# Patient Record
Sex: MALE
[De-identification: names, ages, dates, MRNs, and addresses within clinical notes are randomized per-mention and may not be internally consistent; named-entity substitution may affect disease eponyms.]

---

## 2018-02-15 ENCOUNTER — HOSPITAL ENCOUNTER (INPATIENT)
Dept: HOSPITAL 14 - H.ER | Age: 38
LOS: 3 days | Discharge: HOME | DRG: 901 | End: 2018-02-18
Attending: FAMILY MEDICINE | Admitting: FAMILY MEDICINE
Payer: COMMERCIAL

## 2018-02-15 DIAGNOSIS — A41.9: Primary | ICD-10-CM

## 2018-02-15 DIAGNOSIS — Z23: ICD-10-CM

## 2018-02-15 DIAGNOSIS — K59.00: ICD-10-CM

## 2018-02-15 DIAGNOSIS — K57.20: ICD-10-CM

## 2018-02-15 DIAGNOSIS — R65.20: ICD-10-CM

## 2018-02-15 DIAGNOSIS — Z87.891: ICD-10-CM

## 2018-02-15 DIAGNOSIS — K52.9: ICD-10-CM

## 2018-02-15 LAB
ALBUMIN SERPL-MCNC: 3.8 G/DL (ref 3.5–5)
ALBUMIN/GLOB SERPL: 1.3 {RATIO} (ref 1–2.1)
ALT SERPL-CCNC: 27 U/L (ref 21–72)
AST SERPL-CCNC: 16 U/L (ref 17–59)
BASE EXCESS BLDV CALC-SCNC: 1.7 MMOL/L (ref 0–2)
BASOPHILS # BLD AUTO: 0.1 K/UL (ref 0–0.2)
BASOPHILS NFR BLD: 0.3 % (ref 0–2)
BUN SERPL-MCNC: 18 MG/DL (ref 9–20)
CALCIUM SERPL-MCNC: 8.8 MG/DL (ref 8.4–10.2)
EOSINOPHIL # BLD AUTO: 0.1 K/UL (ref 0–0.7)
EOSINOPHIL NFR BLD: 0.3 % (ref 0–4)
ERYTHROCYTE [DISTWIDTH] IN BLOOD BY AUTOMATED COUNT: 13.3 % (ref 11.5–14.5)
GFR NON-AFRICAN AMERICAN: > 60
HGB BLD-MCNC: 13.4 G/DL (ref 12–18)
LIPASE SERPL-CCNC: 11 U/L (ref 23–300)
LYMPHOCYTE: 12 % (ref 20–50)
LYMPHOCYTES # BLD AUTO: 1.5 K/UL (ref 1–4.3)
LYMPHOCYTES NFR BLD AUTO: 8.6 % (ref 20–40)
MCH RBC QN AUTO: 27.7 PG (ref 27–31)
MCHC RBC AUTO-ENTMCNC: 32.1 G/DL (ref 33–37)
MCV RBC AUTO: 86.4 FL (ref 80–94)
MONOCYTE: 9 % (ref 0–10)
MONOCYTES # BLD: 1 K/UL (ref 0–0.8)
MONOCYTES NFR BLD: 6 % (ref 0–10)
NEUTROPHILS # BLD: 14.7 K/UL (ref 1.8–7)
NEUTROPHILS NFR BLD AUTO: 78 % (ref 42–75)
NEUTROPHILS NFR BLD AUTO: 84.8 % (ref 50–75)
NEUTS BAND NFR BLD: 1 % (ref 0–2)
NRBC BLD AUTO-RTO: 0 % (ref 0–0)
PCO2 BLDV: 45 MMHG (ref 40–60)
PH BLDV: 7.39 [PH] (ref 7.32–7.43)
PLATELET # BLD EST: NORMAL 10*3/UL
PLATELET # BLD: 174 K/UL (ref 130–400)
PMV BLD AUTO: 9.2 FL (ref 7.2–11.7)
RBC # BLD AUTO: 4.85 MIL/UL (ref 4.4–5.9)
RBC MORPH BLD: NORMAL
TOTAL CELLS COUNTED BLD: 100
VENOUS BLOOD FIO2: 21 %
VENOUS BLOOD GAS PO2: 35 MM/HG (ref 30–55)
WBC # BLD AUTO: 17.3 K/UL (ref 4.8–10.8)

## 2018-02-15 RX ADMIN — METRONIDAZOLE SCH MLS/HR: 500 INJECTION, SOLUTION INTRAVENOUS at 22:31

## 2018-02-15 RX ADMIN — WATER SCH: 1 INJECTION INTRAMUSCULAR; INTRAVENOUS; SUBCUTANEOUS at 22:00

## 2018-02-15 NOTE — CP.PCM.HP
History of Present Illness





- History of Present Illness


History of Present Illness: 





36 yo ,m, PMhx/o Colitis presents to ED c/o LLQ abdominal pain started 2 days 

ago, intermittent, 7/10 intensity, pinching sensation,not radiated,  aggravated 

by walking and moving, associated with constipation for the last 2 days. 

Patient reports he had 1 BM today semiliquid non-bloddy. He denies fever, n,v,

cough, chest pain, SOB, Hx/o constipation, odd food ingestion, dysuria. 





PMD: has not PMD, has not  medical insurance


PMHx: Colitis 


Allergies: NKDA


 Meds: none


PSHx: denies


SHx: 1-2 cigarettes per week; social EtOH; denies drug use


FHx: noncontributory





Ed Course


VS: Temp: 98, HR: 108 BP: 137/88


Labs: CBC 17.3>13.4<174  CMP normal.  VBG: normal.lactate 1.0


Imaging: CT abd: Acute diverticulitis affecting the descending colon with 

microperforation  


Meds:  vanco/zosyn. Iv fluids 





Present on Admission





- Present on Admission


Any Indicators Present on Admission: No


History of DVT/PE: No


History of Uncontrolled Diabetes: No


Urinary Catheter: No





Review of Systems





- Constitutional


Constitutional: As Per HPI





- Cardiovascular


Cardiovascular: As Per HPI.  absent: Chest Pain





- Respiratory


Respiratory: absent: Cough, Dyspnea





- Gastrointestinal


Gastrointestinal: Abdominal Pain





- Musculoskeletal


Musculoskeletal: As Per HPI





Past Patient History





- Infectious Disease


Hx of Infectious Diseases: None





- Past Social History


Smoking Status: Unknown If Ever Smoked





- PSYCHIATRIC


Hx Substance Use: No





Meds


Allergies/Adverse Reactions: 


 Allergies











Allergy/AdvReac Type Severity Reaction Status Date / Time


 


No Known Allergies Allergy   Verified 02/15/18 14:16














Physical Exam





- Constitutional


Appears: No Acute Distress





- Head Exam


Head Exam: ATRAUMATIC, NORMOCEPHALIC





- Eye Exam


Eye Exam: Normal appearance





- ENT Exam


ENT Exam: Mucous Membranes Moist





- Neck Exam


Neck exam: Positive for: Normal Inspection





- Respiratory Exam


Respiratory Exam: Clear to Auscultation Bilateral.  absent: Rales, Rhonchi, 

Wheezes, Stridor





- Cardiovascular Exam


Cardiovascular Exam: REGULAR RHYTHM, +S1, +S2





- GI/Abdominal Exam


GI & Abdominal Exam: Normal Bowel Sounds, Soft, Tenderness.  absent: Guarding, 

Rebound


Additional comments: 





LLQ and left flank TD, no rebound Td





- Extremities Exam


Extremities exam: Positive for: normal inspection.  Negative for: pedal edema





- Back Exam


Back exam: NORMAL INSPECTION





- Neurological Exam


Neurological exam: Alert, Oriented x3





- Psychiatric Exam


Psychiatric exam: Normal Affect, Normal Mood





- Skin


Skin Exam: Intact





Results





- Vital Signs


Recent Vital Signs: 





 Last Vital Signs











Temp  98.0 F   02/15/18 14:13


 


Pulse  108 H  02/15/18 14:13


 


Resp  16   02/15/18 14:13


 


BP  137/88   02/15/18 14:13


 


Pulse Ox  100   02/15/18 18:57














- Labs


Result Diagrams: 


 02/15/18 15:05





 02/15/18 15:05


Labs: 





 Laboratory Results - last 24 hr











  02/15/18 02/15/18 02/15/18





  15:05 15:05 19:40


 


WBC   17.3 H 


 


RBC   4.85 


 


Hgb   13.4 


 


Hct   41.9 


 


MCV   86.4 


 


MCH   27.7 


 


MCHC   32.1 L 


 


RDW   13.3 


 


Plt Count   174 


 


MPV   9.2 


 


Neut % (Auto)   84.8 H 


 


Lymph % (Auto)   8.6 L 


 


Mono % (Auto)   6.0 


 


Eos % (Auto)   0.3 


 


Baso % (Auto)   0.3 


 


Neut # (Auto)   14.7 H 


 


Lymph # (Auto)   1.5 


 


Mono # (Auto)   1.0 H 


 


Eos # (Auto)   0.1 


 


Baso # (Auto)   0.1 


 


Neutrophils % (Manual)   78 H 


 


Band Neutrophils %   1 


 


Lymphocytes % (Manual)   12 L 


 


Monocytes % (Manual)   9 


 


Platelet Estimate   Normal 


 


RBC Morphology   Normal 


 


pO2    35


 


VBG pH    7.39


 


VBG pCO2    45


 


VBG HCO3    25.4


 


VBG Total CO2    28.6 H


 


VBG O2 Sat (Calc)    74.7 H


 


VBG Base Excess    1.7


 


VBG Potassium    3.5 L


 


Glucose    101


 


Lactate    1.0


 


FiO2    21.0


 


Sodium  143   138.0


 


Potassium  3.6  


 


Chloride  104   104.0


 


Carbon Dioxide  26  


 


Anion Gap  17  


 


BUN  18  


 


Creatinine  1.1  


 


Est GFR ( Amer)  > 60  


 


Est GFR (Non-Af Amer)  > 60  


 


Random Glucose  127 H  


 


Calcium  8.8  


 


Total Bilirubin  0.9  


 


AST  16 L  


 


ALT  27  


 


Alkaline Phosphatase  63  


 


Total Protein  6.9  


 


Albumin  3.8  


 


Globulin  3.0  


 


Albumin/Globulin Ratio  1.3  


 


Lipase  11 L  


 


Venous Blood Potassium    3.5 L














Assessment & Plan





- Assessment and Plan (Free Text)


Plan: 





36 yo ,m, PMhx/o Colitis admitted for Diverticulitis with perforation and sepsis





1) Diverticulitis complicated with perforation


CTAbd: Acute diverticulitis in descended colon with microperforation


-NPO, IV fluids


-c/w Vanco/Zosyn/Flagyl 


-General surgery consult appreciated: Urgent surgery If patient decompensates 


-f/u CBC, CMP 





2) Sepsis


SIRS+Diverticulitis with perforation


 -c/w  -c/w Vanco/Zosyn/Flagyl 


-General Surgery consult appreciated: Emergent surgery if patient 

decompensates. 








3) DVT Prophylaxis 


-SCD.

## 2018-02-15 NOTE — CP.PCM.CON
History of Present Illness





- History of Present Illness


History of Present Illness: 





General Surgery


Dr. Robles





36 y/o M w/ PMHx of colitis presents to the ED c/o LLQ abd pain. Pt states pain 

began 2 days ago. Pain is non-radiating and constant. Pt denies having similar 

pain in the past. Over the last 2 days, the pain has no changed in intensity. 

Nothing seems to make the pain better or worse. Pt admits to subjective F/C as 

well as some diarrhea. Pt denies N/V, constipation, dysuria, CP. 





PMHx: see above


Meds: reviewed in chart


NKDA


PSHx: denies


SHx: 1-2 cigarettes per week; social EtOH; denies drug use


FHx: noncontributory





Review of Systems





- Review of Systems


All systems: reviewed and no additional remarkable complaints except (see HPI)





Past Patient History





- Infectious Disease


Hx of Infectious Diseases: None





- Past Social History


Smoking Status: Unknown If Ever Smoked





- PSYCHIATRIC


Hx Substance Use: No





Meds


Allergies/Adverse Reactions: 


 Allergies











Allergy/AdvReac Type Severity Reaction Status Date / Time


 


No Known Allergies Allergy   Verified 02/15/18 14:16














- Medications


Medications: 


 Current Medications





Lactated Ringer's (Lactated Ringer's 500ml)  500 mls @ 250 mls/hr IV .Q2H [facility]


   Last Admin: 02/15/18 15:18 Dose:  250 mls/hr


Vancomycin HCl 1 gm/ Sodium (Chloride)  250 mls @ 250 mls/hr IVPB ONCE [facility]


   PRN Reason: Protocol











Physical Exam





- Constitutional


Appears: Non-toxic, No Acute Distress





- Head Exam


Head Exam: NORMAL INSPECTION





- Eye Exam


Eye Exam: Normal appearance





- ENT Exam


ENT Exam: Mucous Membranes Moist





- Respiratory Exam


Respiratory Exam: NORMAL BREATHING PATTERN.  absent: Accessory Muscle Use, 

Respiratory Distress





- Cardiovascular Exam


Cardiovascular Exam: Tachycardia, REGULAR RHYTHM





- GI/Abdominal Exam


GI & Abdominal Exam: Soft, Tenderness (RLQ).  absent: Distended, Guarding, 

Rebound, Rigid





- Extremities Exam


Extremities exam: Positive for: normal inspection





- Neurological Exam


Neurological exam: Alert, Oriented x3





- Psychiatric Exam


Psychiatric exam: Normal Affect, Normal Mood





- Skin


Skin Exam: Dry, Intact, Normal Color, Warm





Results





- Vital Signs


Recent Vital Signs: 


 Last Vital Signs











Temp  98.0 F   02/15/18 14:13


 


Pulse  108 H  02/15/18 14:13


 


Resp  16   02/15/18 14:13


 


BP  137/88   02/15/18 14:13


 


Pulse Ox  100   02/15/18 18:57














- Labs


Result Diagrams: 


 02/15/18 15:05





 02/15/18 15:05


Labs: 


 Laboratory Results - last 24 hr











  02/15/18 02/15/18 02/15/18





  15:05 15:05 19:40


 


WBC   17.3 H 


 


RBC   4.85 


 


Hgb   13.4 


 


Hct   41.9 


 


MCV   86.4 


 


MCH   27.7 


 


MCHC   32.1 L 


 


RDW   13.3 


 


Plt Count   174 


 


MPV   9.2 


 


Neut % (Auto)   84.8 H 


 


Lymph % (Auto)   8.6 L 


 


Mono % (Auto)   6.0 


 


Eos % (Auto)   0.3 


 


Baso % (Auto)   0.3 


 


Neut # (Auto)   14.7 H 


 


Lymph # (Auto)   1.5 


 


Mono # (Auto)   1.0 H 


 


Eos # (Auto)   0.1 


 


Baso # (Auto)   0.1 


 


Neutrophils % (Manual)   78 H 


 


Band Neutrophils %   1 


 


Lymphocytes % (Manual)   12 L 


 


Monocytes % (Manual)   9 


 


Platelet Estimate   Normal 


 


RBC Morphology   Normal 


 


pO2    35


 


VBG pH    7.39


 


VBG pCO2    45


 


VBG HCO3    25.4


 


VBG Total CO2    28.6 H


 


VBG O2 Sat (Calc)    74.7 H


 


VBG Base Excess    1.7


 


VBG Potassium    3.5 L


 


Glucose    101


 


Lactate    1.0


 


FiO2    21.0


 


Sodium  143   138.0


 


Potassium  3.6  


 


Chloride  104   104.0


 


Carbon Dioxide  26  


 


Anion Gap  17  


 


BUN  18  


 


Creatinine  1.1  


 


Est GFR ( Amer)  > 60  


 


Est GFR (Non-Af Amer)  > 60  


 


Random Glucose  127 H  


 


Calcium  8.8  


 


Total Bilirubin  0.9  


 


AST  16 L  


 


ALT  27  


 


Alkaline Phosphatase  63  


 


Total Protein  6.9  


 


Albumin  3.8  


 


Globulin  3.0  


 


Albumin/Globulin Ratio  1.3  


 


Lipase  11 L  


 


Venous Blood Potassium    3.5 L














Assessment & Plan





- Assessment and Plan (Free Text)


Assessment: 





36 y/o M w/ abd pain 2/2 diverticulitis w/ contained microperforation





- NPO, IVF


- IV Abx


- pain management


- serial abd exams


- monitor vitals


- repeat labs in AM


- urgent surgery if pt decompensates





Pt discussed w/ Dr. Travis Oranets DO PGY2








- Date & Time


Date: 02/16/18


Time: 19:45

## 2018-02-15 NOTE — ED PDOC
HPI: Abdomen


Time Seen by Provider: 02/15/18 14:29


Chief Complaint (Nursing): Abdominal Pain


Chief Complaint (Provider): Abdominal pain


History Per: Patient


History/Exam Limitations: no limitations


Onset/Duration Of Symptoms: Days (x2)


Current Symptoms Are (Timing): Still Present


Location Of Pain/Discomfort: LUQ, LLQ


Quality Of Discomfort: "Pain"


Associated Symptoms: Fever, Chills, Constipation.  denies: Nausea, Vomiting, 

Diarrhea, Back Pain, Urinary Symptoms


Exacerbating Factors: Deep Breaths


Additional Complaint(s): 





Jose Pace is a 37 year old male, with a past medical history of colitis, 

who presents to the emergency department complaining of left sided abdominal 

pain associated with fever, chills and constipation onset for x2 days. Patient 

states pain is exacerbated with deep breaths. He denies any nausea, vomit, 

diarrhea, cough, congestion, runny nose, back pain or dysuria. No further 

medical complaints.


 


PMD: None provided. 





Past Medical History


Reviewed: Historical Data, Nursing Documentation, Vital Signs


Vital Signs: 


 Last Vital Signs











Temp  98.0 F   02/15/18 14:13


 


Pulse  108 H  02/15/18 14:13


 


Resp  16   02/15/18 14:13


 


BP  137/88   02/15/18 14:13


 


Pulse Ox  100   02/15/18 15:11














- Medical History


Other PMH: Colitis





- Surgical History


Surgical History: No Surg Hx





- Family History


Family History: States: Unknown Family Hx





- Allergies


Allergies/Adverse Reactions: 


 Allergies











Allergy/AdvReac Type Severity Reaction Status Date / Time


 


No Known Allergies Allergy   Verified 02/15/18 14:16














Review of Systems


ROS Statement: Except As Marked, All Systems Reviewed And Found Negative


Constitutional: Positive for: Fever, Chills


ENT: Negative for: Nose Congestion


Respiratory: Negative for: Cough


Gastrointestinal: Positive for: Abdominal Pain (left sided), Constipation.  

Negative for: Nausea, Vomiting, Diarrhea


Genitourinary Male: Negative for: Dysuria


Musculoskeletal: Negative for: Neck Pain, Back Pain





Physical Exam





- Reviewed


Nursing Documentation Reviewed: Yes


Vital Signs Reviewed: Yes





- Physical Exam


Appears: Positive for: Uncomfortable


Head Exam: Positive for: ATRAUMATIC, NORMAL INSPECTION, NORMOCEPHALIC


Skin: Positive for: Normal Color, Warm, Dry


Eye Exam: Positive for: Normal appearance, EOMI, PERRL


Neck: Positive for: Painless ROM, Supple


Cardiovascular/Chest: Positive for: Regular Rate, Rhythm.  Negative for: Murmur


Respiratory: Positive for: Normal Breath Sounds.  Negative for: Respiratory 

Distress


Gastrointestinal/Abdominal: Positive for: Soft, Tenderness (to upper and lower 

left abdomen. No epigastric or right sided tenderness).  Negative for: Guarding

, Rebound


Back: Positive for: Normal Inspection.  Negative for: L CVA Tenderness, R CVA 

Tenderness, Vertebral Tenderness


Extremity: Positive for: Normal ROM.  Negative for: Tenderness, Deformity, 

Swelling


Neurologic/Psych: Positive for: Alert, Oriented





- Laboratory Results


Result Diagrams: 


 02/15/18 15:05





 02/15/18 15:05


Interpretation Of Abn Labs: 17.3 wbc





- ECG


O2 Sat by Pulse Oximetry: 100 (RA)


Pulse Ox Interpretation: Normal





- Progress


ED Course And Treament: 





1844:  Stable.  Will need admit.  Spoke with surgery resident.  Will consult.  

Spoke with [facility] resident who will admit.





- Critical Care


Total Time (In Min): 30


Documented Critical Care: Time excludes all time spent performint seperately 

billable procedures





Medical Decision Making


Medical Decision Making: 





Initial Impression: Colitis





Initial Plan:





--Abd Pelvis PO & IV contrast [CT]


--CMP


--Lipase


--Urine dip


--CBC w/ differential


--Omnipaque 240 50 ml PO


--Toradol 15 mg IVP


--Lactated Ringers 500 ml  mls/hr


--reevaluation








--------------------------------------------------------------------------------

-----------------   


Scribe Attestation:


Documented by Dylan Ceh, acting as a scribe for Kenton Aguirre MD





Provider Scribe Attestation:


All medical record entries made by the Scribe were at my direction and 

personally dictated by me. I have reviewed the chart and agree that the record 

accurately reflects my personal performance of the history, physical exam, 

medical decision making, and the department course for this patient. I have 

also personally directed, reviewed, and agree with the discharge instructions 

and disposition.





Disposition





- Clinical Impression


Clinical Impression: 


 Severe sepsis, Diverticulitis of colon with perforation








- Patient ED Disposition


Is Patient to be Admitted: Yes


Counseled Patient/Family Regarding: Studies Performed, Diagnosis





- Disposition


Disposition Time: 18:57


Condition: FAIR





- Pt Status Changed To:


Hospital Disposition Of: Inpatient





- Admit Certification


Admit to Inpatient:: After my assessment, the patient will require 

hospitalization for at least two midnights.  This is because of the severity of 

symptoms shown, intensity of services needed, and/or the medical risk in this 

patient being treated as an outpatient.





- POA


Present On Arrival: None

## 2018-02-15 NOTE — CT
PROCEDURE:  CT Abdomen and Pelvis with contrast



HISTORY:

Abdominal pain. 



COMPARISON:

None.



TECHNIQUE:

Contrast dose: 90 cc Omnipaque 300



Radiation dose:



Total exam DLP = 1176.24 mGy-cm.



This CT exam was performed using one or more of the following dose 

reduction techniques: Automated exposure control, adjustment of the 

mA and/or kV according to patient size, and/or use of iterative 

reconstruction technique.



FINDINGS:



LOWER THORAX:

Unremarkable. 



LIVER:

Unremarkable. No gross lesion or ductal dilatation. 



GALLBLADDER AND BILE DUCTS:

Unremarkable. 



PANCREAS:

Unremarkable. No gross lesion or ductal dilatation.



SPLEEN:

Unremarkable. 



ADRENALS:

Unremarkable. No mass. 



KIDNEYS AND URETERS:

Unremarkable. No hydronephrosis. No solid mass. 



VASCULATURE:

Unremarkable. No aortic aneurysm. 



BOWEL:

Acute diverticulitis primarily affecting the descending colon with 

perforations along the mesenteric border with multiple micro 

perforations and the largest perforation measuring approximately 2.4 

x 3 cm. No drainable collection. The acute inflammatory process 

extends over a distance of approximately 12.5 cm. No free air. 



APPENDIX:

Normal appendix. 



PERITONEUM:

Unremarkable. No free fluid. No free air. 



LYMPH NODES:

Unremarkable. No enlarged lymph nodes. 



BLADDER:

Unremarkable. 



REPRODUCTIVE:

Unremarkable. 



BONES:

No acute fracture. 



OTHER FINDINGS:

None.



IMPRESSION:

Acute diverticulitis affecting the descending colon with micro 

perforations and a solitary walled-off larger collection of air 

without drainable collection/abscess.



Communication of results: I discussed the findings with the attending 

in the emergency department Dr. Aguirre at 18:27

## 2018-02-16 LAB
ALBUMIN SERPL-MCNC: 3.3 G/DL (ref 3.5–5)
ALBUMIN/GLOB SERPL: 1.1 {RATIO} (ref 1–2.1)
ALT SERPL-CCNC: 28 U/L (ref 21–72)
AST SERPL-CCNC: 15 U/L (ref 17–59)
BASOPHILS # BLD AUTO: 0 K/UL (ref 0–0.2)
BASOPHILS NFR BLD: 0.3 % (ref 0–2)
BUN SERPL-MCNC: 11 MG/DL (ref 9–20)
CALCIUM SERPL-MCNC: 8.5 MG/DL (ref 8.4–10.2)
EOSINOPHIL # BLD AUTO: 0.1 K/UL (ref 0–0.7)
EOSINOPHIL NFR BLD: 0.9 % (ref 0–4)
ERYTHROCYTE [DISTWIDTH] IN BLOOD BY AUTOMATED COUNT: 13.1 % (ref 11.5–14.5)
GFR NON-AFRICAN AMERICAN: > 60
HGB BLD-MCNC: 12.6 G/DL (ref 12–18)
LYMPHOCYTES # BLD AUTO: 1.3 K/UL (ref 1–4.3)
LYMPHOCYTES NFR BLD AUTO: 9.1 % (ref 20–40)
MCH RBC QN AUTO: 27.7 PG (ref 27–31)
MCHC RBC AUTO-ENTMCNC: 32 G/DL (ref 33–37)
MCV RBC AUTO: 86.6 FL (ref 80–94)
MONOCYTES # BLD: 1.1 K/UL (ref 0–0.8)
MONOCYTES NFR BLD: 7.4 % (ref 0–10)
NEUTROPHILS # BLD: 12.1 K/UL (ref 1.8–7)
NEUTROPHILS NFR BLD AUTO: 82.3 % (ref 50–75)
NRBC BLD AUTO-RTO: 0 % (ref 0–0)
PLATELET # BLD: 173 K/UL (ref 130–400)
PMV BLD AUTO: 10 FL (ref 7.2–11.7)
RBC # BLD AUTO: 4.54 MIL/UL (ref 4.4–5.9)
WBC # BLD AUTO: 14.8 K/UL (ref 4.8–10.8)

## 2018-02-16 RX ADMIN — ENOXAPARIN SODIUM SCH MG: 40 INJECTION SUBCUTANEOUS at 13:08

## 2018-02-16 RX ADMIN — METRONIDAZOLE SCH MLS/HR: 500 INJECTION, SOLUTION INTRAVENOUS at 09:40

## 2018-02-16 RX ADMIN — WATER SCH MLS/HR: 1 INJECTION INTRAMUSCULAR; INTRAVENOUS; SUBCUTANEOUS at 21:31

## 2018-02-16 RX ADMIN — WATER SCH MLS/HR: 1 INJECTION INTRAMUSCULAR; INTRAVENOUS; SUBCUTANEOUS at 09:40

## 2018-02-16 RX ADMIN — WATER SCH MLS/HR: 1 INJECTION INTRAMUSCULAR; INTRAVENOUS; SUBCUTANEOUS at 17:11

## 2018-02-16 RX ADMIN — METRONIDAZOLE SCH MLS/HR: 500 INJECTION, SOLUTION INTRAVENOUS at 17:12

## 2018-02-16 RX ADMIN — METRONIDAZOLE SCH: 500 INJECTION, SOLUTION INTRAVENOUS at 01:00

## 2018-02-16 RX ADMIN — WATER SCH MLS/HR: 1 INJECTION INTRAMUSCULAR; INTRAVENOUS; SUBCUTANEOUS at 03:38

## 2018-02-16 NOTE — CP.PCM.PN
Subjective





- Date & Time of Evaluation


Date of Evaluation: 02/16/18


Time of Evaluation: 10:04





- Subjective


Subjective: 





Patient seen and examined this morning, NAD, NPO, IV fluids. As per patient, 

his pain is improved, still 5/10 nonradiating LLQ pain. Denies any nausea, 

vomiting, diarrhea, dizziness, skin color changes or urinary symptoms. Patient 

admits feeling hungry.  





Objective





- Vital Signs/Intake and Output


Vital Signs (last 24 hours): 


 











Temp Pulse Resp BP Pulse Ox


 


 98.7 F   93 H  18   101/65   99 


 


 02/16/18 08:00  02/16/18 08:00  02/16/18 08:00  02/16/18 08:00  02/16/18 08:00








Intake and Output: 


 











 02/16/18 02/16/18





 06:59 18:59


 


Intake Total 1400 


 


Output Total 250 


 


Balance 1150 














- Medications


Medications: 


 Current Medications





Acetaminophen (Tylenol 325mg Tab)  650 mg PO Q4 PRN


   PRN Reason: mild pain; fever >100.4


   Last Admin: 02/16/18 04:41 Dose:  650 mg


Hydromorphone HCl (Dilaudid)  1 mg IVP Q3 PRN


   PRN Reason: Pain, severe (8-10)


Hydromorphone HCl (Dilaudid)  0.5 mg IVP Q3 PRN


   PRN Reason: Pain, moderate (4-7)


Lactated Ringer's (Lactated Ringer's 500ml)  500 mls @ 250 mls/hr IV .Q2H [facility]


   Last Admin: 02/15/18 20:32 Dose:  250 mls/hr


Vancomycin HCl 1 gm/ Sodium (Chloride)  250 mls @ 250 mls/hr IVPB ONCE [facility]


   PRN Reason: Protocol


Metronidazole (Flagyl 500mg/100ml Ns)  100 mls @ 100 mls/hr IVPB Q8 [facility]


   PRN Reason: Protocol


   Last Admin: 02/16/18 09:40 Dose:  100 mls/hr


Lactated Ringer's (Lactated Ringer's)  1,000 mls @ 150 mls/hr IV .Q6H40M [facility]


   Last Admin: 02/16/18 04:44 Dose:  150 mls/hr


Piperacillin Sod/Tazobactam (Sod 3.375 gm/ Sodium Chloride)  100 mls @ 100 mls/

hr IVPB Q6 [facility]


   PRN Reason: Protocol


   Last Admin: 02/16/18 09:40 Dose:  100 mls/hr











- Labs


Labs: 


 





 02/16/18 04:17 





 02/16/18 04:17 











- Constitutional


Appears: No Acute Distress





- Head Exam


Head Exam: ATRAUMATIC, NORMAL INSPECTION, NORMOCEPHALIC





- Eye Exam


Eye Exam: EOMI, PERRL, Scleral icterus (mild)





- ENT Exam


ENT Exam: Mucous Membranes Moist, Normal Exam





- Neck Exam


Neck Exam: Full ROM





- Respiratory Exam


Respiratory Exam: Clear to Ausculation Bilateral, NORMAL BREATHING PATTERN





- Cardiovascular Exam


Cardiovascular Exam: Tachycardia, REGULAR RHYTHM, +S1, +S2





- GI/Abdominal Exam


GI & Abdominal Exam: Soft, Tenderness (LLQ), Hyperactive Bowel Sounds, Rebound.

  absent: Distended, Mass, Organomegaly





- Extremities Exam


Extremities Exam: Normal Capillary Refill, Normal Inspection.  absent: Calf 

Tenderness





- Back Exam


Back Exam: absent: CVA tenderness (L), CVA tenderness (R)





- Neurological Exam


Neurological Exam: Alert, Awake, CN II-XII Intact, Oriented x3





- Psychiatric Exam


Psychiatric exam: Normal Affect, Normal Mood





- Skin


Skin Exam: Dry, Intact, Normal Color





Assessment and Plan





- Assessment and Plan (Free Text)


Assessment: 





A/P:





38 y/o male with acute diverticulitis with microperforation





Acute Diverticulitis with microperforation


- Improving, pain management 


- Continue IVF, LR 150ml/hr 


- Surgery Consult appreciated: Continue Abx and CLD,will follow surgery martha' 


- pt will need GI f/u with outpatient colonoscopy in 6-8 weeks


- GI consulted, Dr. Saldana, follow up GI as outpatient for colonoscopy 


- Continue Metronidazol and Zosyn 


- Stop Vanco 


- Follow up Bcx 


- Consider Influenza test and Bcx if Fever 





DVT PPX


- Lovenox 40mg SC daily

## 2018-02-16 NOTE — CP.PCM.CON
<Sofiya Kay - Last Filed: 02/16/18 11:50>





History of Present Illness





- History of Present Illness


History of Present Illness: 


GI Fellow PGY4 Consult Note


This is a 37yM with PMHx of colitis presents to the ED with complaints of LLQ 

abd pain which began 3 days ago. Pain is sharp non-radiating and constant. Pt 

admits to subjective F/C which started at the time of the pain. Pt also reports 

having constipation for 3 days but prior to this he had regular BM daily with 

no straining. Pt denies rectal bleeding, unintentional weightloss, or family hx 

of colon cancer. Pt reports having colitis [place] 

which he was treated with abx and did not have any GI problems until now.  No 

prior EGD/Colonoscopy. Pt seen and evaluated today and still having LLQ pain, 

pt has been advanced to clear liquid diet by surgery for later today. Pt 

reports passing gas with no BM. Pt also had fevers overnight. [place] 

 with indemand was used to discuss case and explain to pt the plan 

of care. 





ROS: A 12pt ROS was negative except as above


PMHx: As stated in HPI


PSHx: denies


SHx: 1-2 cigarettes per week; social EtOH; denies drug use


FHx:No family hx of colon cancer 








Past Patient History





- Infectious Disease


Hx of Infectious Diseases: None





- Past Medical History & Family History


Past Medical History?: Yes





- Past Social History


Smoking Status: Unknown If Ever Smoked





- CARDIAC


Hx Cardiac Disorders: No





- PULMONARY


Hx Respiratory Disorders: No





- NEUROLOGICAL


Hx Neurological Disorder: No





- HEENT


Hx HEENT Problems: No





- RENAL


Hx Chronic Kidney Disease: No





- ENDOCRINE/METABOLIC


Hx Endocrine Disorders: No





- HEMATOLOGICAL/ONCOLOGICAL


Hx Blood Disorders: No





- INTEGUMENTARY


Hx Dermatological Problems: No





- MUSCULOSKELETAL/RHEUMATOLOGICAL


Hx Musculoskeletal Disorders: No





- GASTROINTESTINAL


Hx Gastrointestinal Disorders: Yes


Hx Colitis: Yes





- GENITOURINARY/GYNECOLOGICAL


Hx Genitourinary Disorders: Yes





- PSYCHIATRIC


Hx Substance Use: No





- SURGICAL HISTORY


Hx Surgeries: No





- ANESTHESIA


Hx Anesthesia: No





Meds


Allergies/Adverse Reactions: 


 Allergies











Allergy/AdvReac Type Severity Reaction Status Date / Time


 


No Known Allergies Allergy   Verified 02/15/18 14:16














- Medications


Medications: 


 Current Medications





Acetaminophen (Tylenol 325mg Tab)  650 mg PO Q4 PRN


   PRN Reason: mild pain; fever >100.4


   Last Admin: 02/16/18 04:41 Dose:  650 mg


Enoxaparin Sodium (Lovenox)  40 mg SC DAILY [facility]


   PRN Reason: Protocol


Hydromorphone HCl (Dilaudid)  1 mg IVP Q3 PRN


   PRN Reason: Pain, severe (8-10)


Hydromorphone HCl (Dilaudid)  0.5 mg IVP Q3 PRN


   PRN Reason: Pain, moderate (4-7)


Lactated Ringer's (Lactated Ringer's 500ml)  500 mls @ 250 mls/hr IV .Q2H [facility]


   Last Admin: 02/15/18 20:32 Dose:  250 mls/hr


Metronidazole (Flagyl 500mg/100ml Ns)  100 mls @ 100 mls/hr IVPB Q8 [facility]


   PRN Reason: Protocol


   Last Admin: 02/16/18 09:40 Dose:  100 mls/hr


Lactated Ringer's (Lactated Ringer's)  1,000 mls @ 150 mls/hr IV .Q6H40M [facility]


   Last Admin: 02/16/18 04:44 Dose:  150 mls/hr


Piperacillin Sod/Tazobactam (Sod 3.375 gm/ Sodium Chloride)  100 mls @ 100 mls/

hr IVPB Q6 [facility]


   PRN Reason: Protocol


   Last Admin: 02/16/18 09:40 Dose:  100 mls/hr











Physical Exam





- Constitutional


Appears: Non-toxic, No Acute Distress





- Head Exam


Head Exam: ATRAUMATIC, NORMAL INSPECTION, NORMOCEPHALIC





- Eye Exam


Eye Exam: EOMI, Normal appearance, PERRL


Pupil Exam: PERRL





- ENT Exam


ENT Exam: Mucous Membranes Moist





- Respiratory Exam


Respiratory Exam: Clear to Auscultation Bilateral, NORMAL BREATHING PATTERN





- Cardiovascular Exam


Cardiovascular Exam: REGULAR RHYTHM





- GI/Abdominal Exam


GI & Abdominal Exam: Normal Bowel Sounds, Soft, Tenderness.  absent: Distended, 

Guarding, Organomegaly, Rigid





- Extremities Exam


Extremities exam: Positive for: full ROM, normal inspection





- Back Exam


Back exam: NORMAL INSPECTION





- Neurological Exam


Neurological exam: Alert, Oriented x3





- Psychiatric Exam


Psychiatric exam: Normal Affect, Normal Mood





- Skin


Skin Exam: Dry, Intact, Normal Color, Warm





Results





- Vital Signs


Recent Vital Signs: 


 Last Vital Signs











Temp  98.7 F   02/16/18 08:00


 


Pulse  93 H  02/16/18 08:00


 


Resp  18   02/16/18 08:00


 


BP  101/65   02/16/18 08:00


 


Pulse Ox  99   02/16/18 08:00














- Labs


Result Diagrams: 


 02/16/18 04:17





 02/16/18 04:17


Labs: 


 Laboratory Results - last 24 hr











  02/15/18 02/15/18 02/15/18





  15:05 15:05 19:40


 


WBC   17.3 H 


 


RBC   4.85 


 


Hgb   13.4 


 


Hct   41.9 


 


MCV   86.4 


 


MCH   27.7 


 


MCHC   32.1 L 


 


RDW   13.3 


 


Plt Count   174 


 


MPV   9.2 


 


Neut % (Auto)   84.8 H 


 


Lymph % (Auto)   8.6 L 


 


Mono % (Auto)   6.0 


 


Eos % (Auto)   0.3 


 


Baso % (Auto)   0.3 


 


Neut # (Auto)   14.7 H 


 


Lymph # (Auto)   1.5 


 


Mono # (Auto)   1.0 H 


 


Eos # (Auto)   0.1 


 


Baso # (Auto)   0.1 


 


Neutrophils % (Manual)   78 H 


 


Band Neutrophils %   1 


 


Lymphocytes % (Manual)   12 L 


 


Monocytes % (Manual)   9 


 


Platelet Estimate   Normal 


 


RBC Morphology   Normal 


 


pO2    35


 


VBG pH    7.39


 


VBG pCO2    45


 


VBG HCO3    25.4


 


VBG Total CO2    28.6 H


 


VBG O2 Sat (Calc)    74.7 H


 


VBG Base Excess    1.7


 


VBG Potassium    3.5 L


 


Glucose    101


 


Lactate    1.0


 


FiO2    21.0


 


Sodium  143   138.0


 


Potassium  3.6  


 


Chloride  104   104.0


 


Carbon Dioxide  26  


 


Anion Gap  17  


 


BUN  18  


 


Creatinine  1.1  


 


Est GFR ( Amer)  > 60  


 


Est GFR (Non-Af Amer)  > 60  


 


Random Glucose  127 H  


 


Calcium  8.8  


 


Total Bilirubin  0.9  


 


AST  16 L  


 


ALT  27  


 


Alkaline Phosphatase  63  


 


Total Protein  6.9  


 


Albumin  3.8  


 


Globulin  3.0  


 


Albumin/Globulin Ratio  1.3  


 


Lipase  11 L  


 


Venous Blood Potassium    3.5 L














  02/16/18 02/16/18





  04:17 04:17


 


WBC  14.8 H 


 


RBC  4.54 


 


Hgb  12.6 


 


Hct  39.3 


 


MCV  86.6 


 


MCH  27.7 


 


MCHC  32.0 L 


 


RDW  13.1 


 


Plt Count  173 


 


MPV  10.0 


 


Neut % (Auto)  82.3 H 


 


Lymph % (Auto)  9.1 L 


 


Mono % (Auto)  7.4 


 


Eos % (Auto)  0.9 


 


Baso % (Auto)  0.3 


 


Neut # (Auto)  12.1 H 


 


Lymph # (Auto)  1.3 


 


Mono # (Auto)  1.1 H 


 


Eos # (Auto)  0.1 


 


Baso # (Auto)  0.0 


 


Neutrophils % (Manual)  


 


Band Neutrophils %  


 


Lymphocytes % (Manual)  


 


Monocytes % (Manual)  


 


Platelet Estimate  


 


RBC Morphology  


 


pO2  


 


VBG pH  


 


VBG pCO2  


 


VBG HCO3  


 


VBG Total CO2  


 


VBG O2 Sat (Calc)  


 


VBG Base Excess  


 


VBG Potassium  


 


Glucose  


 


Lactate  


 


FiO2  


 


Sodium   142


 


Potassium   3.7


 


Chloride   106


 


Carbon Dioxide   24


 


Anion Gap   16


 


BUN   11


 


Creatinine   0.8


 


Est GFR ( Amer)   > 60


 


Est GFR (Non-Af Amer)   > 60


 


Random Glucose   84


 


Calcium   8.5


 


Total Bilirubin   0.8


 


AST   15 L


 


ALT   28


 


Alkaline Phosphatase   68


 


Total Protein   6.5


 


Albumin   3.3 L


 


Globulin   3.2


 


Albumin/Globulin Ratio   1.1


 


Lipase  


 


Venous Blood Potassium  














Assessment & Plan





- Assessment and Plan (Free Text)


Assessment: 


This is a 37yM with pmhx of colitis presenting with complains of LLQ pain, 

fevers and constipation for 3 days. 


1. Acute Diverticulitis with microperforation 


2. Constipation 





Plan: 


-Continue supportive care with pain control and anti-emetics


-Continue IV abx


-Pt still with LLQ abdominal pain, fevers and WBC


-Recommend NPO, advance diet per surgery


-CT Imaging reviewed with multiple microperforation in descending colon and 

stool 


-Bowel regimen with Miralax and colace daily


-Discussed with pt need for colonoscopy in 8 weeks, will need to follow up with 

GI as an outpt


-Will continue to follow closely 











<Briana Saldana - Last Filed: 02/16/18 14:47>





Meds





- Medications


Medications: 


 Current Medications





Acetaminophen (Tylenol 325mg Tab)  650 mg PO Q4 PRN


   PRN Reason: mild pain; fever >100.4


   Last Admin: 02/16/18 04:41 Dose:  650 mg


Enoxaparin Sodium (Lovenox)  40 mg SC DAILY [facility]


   PRN Reason: Protocol


   Last Admin: 02/16/18 13:08 Dose:  40 mg


Hydromorphone HCl (Dilaudid)  1 mg IVP Q3 PRN


   PRN Reason: Pain, severe (8-10)


Hydromorphone HCl (Dilaudid)  0.5 mg IVP Q3 PRN


   PRN Reason: Pain, moderate (4-7)


Lactated Ringer's (Lactated Ringer's 500ml)  500 mls @ 250 mls/hr IV .Q2H [facility]


   Last Admin: 02/15/18 20:32 Dose:  250 mls/hr


Metronidazole (Flagyl 500mg/100ml Ns)  100 mls @ 100 mls/hr IVPB Q8 [facility]


   PRN Reason: Protocol


   Last Admin: 02/16/18 09:40 Dose:  100 mls/hr


Lactated Ringer's (Lactated Ringer's)  1,000 mls @ 150 mls/hr IV .Q6H40M [facility]


   Last Admin: 02/16/18 13:08 Dose:  150 mls/hr


Piperacillin Sod/Tazobactam (Sod 3.375 gm/ Sodium Chloride)  100 mls @ 100 mls/

hr IVPB Q6 [facility]


   PRN Reason: Protocol


   Last Admin: 02/16/18 09:40 Dose:  100 mls/hr











Results





- Vital Signs


Recent Vital Signs: 


 Last Vital Signs











Temp  98.3 F   02/16/18 12:15


 


Pulse  97 H  02/16/18 12:15


 


Resp  18   02/16/18 12:15


 


BP  119/76   02/16/18 12:15


 


Pulse Ox  96   02/16/18 12:15














- Labs


Result Diagrams: 


 02/16/18 04:17





 02/16/18 04:17


Labs: 


 Laboratory Results - last 24 hr











  02/15/18 02/15/18 02/15/18





  15:05 15:05 19:40


 


WBC   17.3 H 


 


RBC   4.85 


 


Hgb   13.4 


 


Hct   41.9 


 


MCV   86.4 


 


MCH   27.7 


 


MCHC   32.1 L 


 


RDW   13.3 


 


Plt Count   174 


 


MPV   9.2 


 


Neut % (Auto)   84.8 H 


 


Lymph % (Auto)   8.6 L 


 


Mono % (Auto)   6.0 


 


Eos % (Auto)   0.3 


 


Baso % (Auto)   0.3 


 


Neut # (Auto)   14.7 H 


 


Lymph # (Auto)   1.5 


 


Mono # (Auto)   1.0 H 


 


Eos # (Auto)   0.1 


 


Baso # (Auto)   0.1 


 


Neutrophils % (Manual)   78 H 


 


Band Neutrophils %   1 


 


Lymphocytes % (Manual)   12 L 


 


Monocytes % (Manual)   9 


 


Platelet Estimate   Normal 


 


RBC Morphology   Normal 


 


pO2    35


 


VBG pH    7.39


 


VBG pCO2    45


 


VBG HCO3    25.4


 


VBG Total CO2    28.6 H


 


VBG O2 Sat (Calc)    74.7 H


 


VBG Base Excess    1.7


 


VBG Potassium    3.5 L


 


Glucose    101


 


Lactate    1.0


 


FiO2    21.0


 


Sodium  143   138.0


 


Potassium  3.6  


 


Chloride  104   104.0


 


Carbon Dioxide  26  


 


Anion Gap  17  


 


BUN  18  


 


Creatinine  1.1  


 


Est GFR ( Amer)  > 60  


 


Est GFR (Non-Af Amer)  > 60  


 


Random Glucose  127 H  


 


Calcium  8.8  


 


Total Bilirubin  0.9  


 


AST  16 L  


 


ALT  27  


 


Alkaline Phosphatase  63  


 


Total Protein  6.9  


 


Albumin  3.8  


 


Globulin  3.0  


 


Albumin/Globulin Ratio  1.3  


 


Lipase  11 L  


 


Venous Blood Potassium    3.5 L














  02/16/18 02/16/18





  04:17 04:17


 


WBC  14.8 H 


 


RBC  4.54 


 


Hgb  12.6 


 


Hct  39.3 


 


MCV  86.6 


 


MCH  27.7 


 


MCHC  32.0 L 


 


RDW  13.1 


 


Plt Count  173 


 


MPV  10.0 


 


Neut % (Auto)  82.3 H 


 


Lymph % (Auto)  9.1 L 


 


Mono % (Auto)  7.4 


 


Eos % (Auto)  0.9 


 


Baso % (Auto)  0.3 


 


Neut # (Auto)  12.1 H 


 


Lymph # (Auto)  1.3 


 


Mono # (Auto)  1.1 H 


 


Eos # (Auto)  0.1 


 


Baso # (Auto)  0.0 


 


Neutrophils % (Manual)  


 


Band Neutrophils %  


 


Lymphocytes % (Manual)  


 


Monocytes % (Manual)  


 


Platelet Estimate  


 


RBC Morphology  


 


pO2  


 


VBG pH  


 


VBG pCO2  


 


VBG HCO3  


 


VBG Total CO2  


 


VBG O2 Sat (Calc)  


 


VBG Base Excess  


 


VBG Potassium  


 


Glucose  


 


Lactate  


 


FiO2  


 


Sodium   142


 


Potassium   3.7


 


Chloride   106


 


Carbon Dioxide   24


 


Anion Gap   16


 


BUN   11


 


Creatinine   0.8


 


Est GFR ( Amer)   > 60


 


Est GFR (Non-Af Amer)   > 60


 


Random Glucose   84


 


Calcium   8.5


 


Total Bilirubin   0.8


 


AST   15 L


 


ALT   28


 


Alkaline Phosphatase   68


 


Total Protein   6.5


 


Albumin   3.3 L


 


Globulin   3.2


 


Albumin/Globulin Ratio   1.1


 


Lipase  


 


Venous Blood Potassium  














Attending/Attestation





- Attestation


I have personally seen and examined this patient.: Yes


I have fully participated in the care of the patient.: Yes


I have reviewed all pertinent clinical information: Yes


Notes (Text): 





02/16/18 14:43


This is a 37 year old M with history of colitis presenting with complains of 

LLQ pain, fevers and constipation for 3 days. CT with IV contrast suggestive of 

left sided diverticulitis and microperforation. Phlegmon formation on review 

with leukocytosis. On physical exam patient has significant guarding and 

tenderness on LLQ. On flagyl and ciprofloxacin IV which I agree. Surgical 

consult noted. Needs close monitoring for full mural perfortaion risk. 

Discussed with primary team. 





Plan: 


-Continue supportive care with pain control and anti-emetics


-Continue IV antibiotics as you are doing


- Close monitoring for s/s of sepsis


- Daily trend of fever and leukocytosis


-Recommend NPO, advance diet per surgery


-CT Imaging reviewed with multiple microperforation in descending colon and 

stool and phlegmon ?


- Will recommend daily surgical follow up due to high risk of perforaion


-Discussed with pt need for colonoscopy in 8 weeks, will need to follow up with 

GI in [facility] clinic as an outpatient


- No other GI work up is indicated at this time


-Will continue to follow closely

## 2018-02-16 NOTE — CP.PCM.PN
Subjective





- Date & Time of Evaluation


Date of Evaluation: 02/16/18


Time of Evaluation: 07:44





- Subjective


Subjective: 





General Surgery:  Dr Tinsley





Pt S&E.  Reports LLQ pain has improved.  Febrile this morning.  Otherwise 

denies n/v.  Continued diarrhea.  





Objective





- Vital Signs/Intake and Output


Vital Signs (last 24 hours): 


 











Temp Pulse Resp BP Pulse Ox


 


 99.9 F H  110 H  18   119/73   97 


 


 02/16/18 05:41  02/16/18 05:15  02/16/18 05:15  02/16/18 05:15  02/16/18 05:15








Intake and Output: 


 











 02/16/18 02/16/18





 06:59 18:59


 


Intake Total 1400 


 


Output Total 250 


 


Balance 1150 














- Medications


Medications: 


 Current Medications





Acetaminophen (Tylenol 325mg Tab)  650 mg PO Q4 PRN


   PRN Reason: mild pain; fever >100.4


   Last Admin: 02/16/18 04:41 Dose:  650 mg


Hydromorphone HCl (Dilaudid)  1 mg IVP Q3 PRN


   PRN Reason: Pain, severe (8-10)


Hydromorphone HCl (Dilaudid)  0.5 mg IVP Q3 PRN


   PRN Reason: Pain, moderate (4-7)


Lactated Ringer's (Lactated Ringer's 500ml)  500 mls @ 250 mls/hr IV .Q2H [facility]


   Last Admin: 02/15/18 20:32 Dose:  250 mls/hr


Vancomycin HCl 1 gm/ Sodium (Chloride)  250 mls @ 250 mls/hr IVPB ONCE [facility]


   PRN Reason: Protocol


Metronidazole (Flagyl 500mg/100ml Ns)  100 mls @ 100 mls/hr IVPB Q8 [facility]


   PRN Reason: Protocol


   Last Admin: 02/16/18 01:00 Dose:  Not Given


Lactated Ringer's (Lactated Ringer's)  1,000 mls @ 150 mls/hr IV .Q6H40M [facility]


   Last Admin: 02/16/18 04:44 Dose:  150 mls/hr


Piperacillin Sod/Tazobactam (Sod 3.375 gm/ Sodium Chloride)  100 mls @ 100 mls/

hr IVPB Q6 [facility]


   PRN Reason: Protocol


   Last Admin: 02/16/18 03:38 Dose:  100 mls/hr


Influenza Virus Vaccine (Afluria (Pf)(18yr & Older))  0.5 ml IM .ONCE ONE


   Stop: 02/16/18 09:01


Pneumococcal Polyvalent Vaccine (Pneumovax 23 Vaccine)  0.5 ml IM .ONCE ONE


   Stop: 02/16/18 09:01











- Labs


Labs: 


 





 02/16/18 04:17 





 02/16/18 04:17 











- Constitutional


Appears: Non-toxic, No Acute Distress





- ENT Exam


ENT Exam: Mucous Membranes Moist





- Respiratory Exam


Respiratory Exam: absent: Respiratory Distress





- Cardiovascular Exam


Cardiovascular Exam: absent: Tachycardia





- GI/Abdominal Exam


GI & Abdominal Exam: Soft, Tenderness (LLQ).  absent: Distended, Firm, Guarding

, Mass, Rebound





- Neurological Exam


Neurological Exam: Alert, Awake, Oriented x3





- Psychiatric Exam


Psychiatric exam: Normal Affect, Normal Mood





Assessment and Plan





- Assessment and Plan (Free Text)


Assessment: 





37M with microperforated diverticulitis


Plan: 





cont abx


adv to [facility] 


pt will need GI f/u with outpatient colonoscopy in 6-8 weeks





will d/w Dr Travis Masterson, PGY3

## 2018-02-17 RX ADMIN — METRONIDAZOLE SCH MLS/HR: 500 INJECTION, SOLUTION INTRAVENOUS at 09:06

## 2018-02-17 RX ADMIN — WATER SCH MLS/HR: 1 INJECTION INTRAMUSCULAR; INTRAVENOUS; SUBCUTANEOUS at 09:07

## 2018-02-17 RX ADMIN — WATER SCH MLS/HR: 1 INJECTION INTRAMUSCULAR; INTRAVENOUS; SUBCUTANEOUS at 04:17

## 2018-02-17 RX ADMIN — WATER SCH MLS/HR: 1 INJECTION INTRAMUSCULAR; INTRAVENOUS; SUBCUTANEOUS at 21:26

## 2018-02-17 RX ADMIN — METRONIDAZOLE SCH MLS/HR: 500 INJECTION, SOLUTION INTRAVENOUS at 01:05

## 2018-02-17 RX ADMIN — WATER SCH MLS/HR: 1 INJECTION INTRAMUSCULAR; INTRAVENOUS; SUBCUTANEOUS at 16:57

## 2018-02-17 RX ADMIN — METRONIDAZOLE SCH MLS/HR: 500 INJECTION, SOLUTION INTRAVENOUS at 16:57

## 2018-02-17 RX ADMIN — ENOXAPARIN SODIUM SCH MG: 40 INJECTION SUBCUTANEOUS at 08:50

## 2018-02-17 NOTE — CP.PCM.PN
Subjective





- Date & Time of Evaluation


Date of Evaluation: 02/17/18


Time of Evaluation: 14:27





- Subjective


Subjective: 





RFV: Diverticultiis


S: No acute events. Reports significant improvement in abdominal pain. Some 

loose bm, but also improving. No bleeding or fever. No n/v. 





Objective





- Vital Signs/Intake and Output


Vital Signs (last 24 hours): 


 











Temp Pulse Resp BP Pulse Ox


 


 98 F   80   16   125/84   97 


 


 02/17/18 12:00  02/17/18 12:00  02/17/18 12:00  02/17/18 12:00  02/17/18 12:00








Intake and Output: 


 











 02/17/18 02/17/18





 06:59 18:59


 


Intake Total 2200 


 


Output Total 1800 


 


Balance 400 














- Medications


Medications: 


 Current Medications





Acetaminophen (Tylenol 325mg Tab)  650 mg PO Q4 PRN


   PRN Reason: mild pain; fever >100.4


   Last Admin: 02/16/18 04:41 Dose:  650 mg


Enoxaparin Sodium (Lovenox)  40 mg SC DAILY [facility]


   PRN Reason: Protocol


   Last Admin: 02/17/18 08:50 Dose:  40 mg


Hydromorphone HCl (Dilaudid)  1 mg IVP Q3 PRN


   PRN Reason: Pain, severe (8-10)


Hydromorphone HCl (Dilaudid)  0.5 mg IVP Q3 PRN


   PRN Reason: Pain, moderate (4-7)


Lactated Ringer's (Lactated Ringer's 500ml)  500 mls @ 250 mls/hr IV .Q2H [facility]


   Last Admin: 02/16/18 21:33 Dose:  Not Given


Metronidazole (Flagyl 500mg/100ml Ns)  100 mls @ 100 mls/hr IVPB Q8 [facility]


   PRN Reason: Protocol


   Last Admin: 02/17/18 09:06 Dose:  100 mls/hr


Lactated Ringer's (Lactated Ringer's)  1,000 mls @ 150 mls/hr IV .Q6H40M [facility]


   Last Admin: 02/17/18 08:58 Dose:  150 mls/hr


Piperacillin Sod/Tazobactam (Sod 3.375 gm/ Sodium Chloride)  100 mls @ 100 mls/

hr IVPB Q6 [facility]


   PRN Reason: Protocol


   Last Admin: 02/17/18 09:07 Dose:  100 mls/hr


Lidocaine (Lidoderm)  2 ea TD DAILY [facility]


   Last Admin: 02/17/18 08:52 Dose:  2 ea


Tramadol HCl (Ultram)  50 mg PO BID [facility]


   Last Admin: 02/17/18 08:56 Dose:  50 mg











- Labs


Labs: 


 





 02/16/18 04:17 





 02/16/18 04:17 











- Constitutional


Appears: Well, No Acute Distress





- Head Exam


Head Exam: ATRAUMATIC, NORMOCEPHALIC





- Eye Exam


Eye Exam: absent: Scleral icterus


Pupil Exam: PERRL





- ENT Exam


ENT Exam: Mucous Membranes Moist





- Respiratory Exam


Respiratory Exam: NORMAL BREATHING PATTERN





- Cardiovascular Exam


Cardiovascular Exam: +S1, +S2





- GI/Abdominal Exam


GI & Abdominal Exam: Soft.  absent: Tenderness





- Extremities Exam


Extremities Exam: Normal Capillary Refill





- Neurological Exam


Neurological Exam: Alert, Oriented x3





- Skin


Skin Exam: Dry, Warm





Assessment and Plan





- Assessment and Plan (Free Text)


Assessment: 





37 year old male with a h/o colitis admitted with left sided diverticulitis 

with microperforation.


1. Acute diverticulitis


Plan:


-continue IV abx


-surgical eval appreciated


-improving clinically


-advance diet as tolerated


-colonoscopy in 2 months if he continues to improve clinically

## 2018-02-17 NOTE — CP.PCM.PN
Subjective





- Date & Time of Evaluation


Date of Evaluation: 02/17/18


Time of Evaluation: 05:20





- Subjective


Subjective: 





General Surgery- Dr. Robles





Patient seen and examined at bedside this AM. nursing notes reviewed, no acute 

events overnight. afebrile over 24hrs, 900cc uo overnight. LLQ pain better than 

yesterday. tolerating CLD. passing rickey, + Loose BM. currently has an appetite. 

Denies F/C CP/SOB N/V/D 





Objective





- Vital Signs/Intake and Output


Vital Signs (last 24 hours): 


 











Temp Pulse Resp BP Pulse Ox


 


 98.7 F   81   18   107/69   97 


 


 02/17/18 01:07  02/17/18 01:07  02/17/18 01:07  02/17/18 01:07  02/17/18 01:07








Intake and Output: 


 











 02/16/18 02/17/18





 18:59 06:59


 


Intake Total  2200


 


Output Total  1800


 


Balance  400














- Medications


Medications: 


 Current Medications





Acetaminophen (Tylenol 325mg Tab)  650 mg PO Q4 PRN


   PRN Reason: mild pain; fever >100.4


   Last Admin: 02/16/18 04:41 Dose:  650 mg


Enoxaparin Sodium (Lovenox)  40 mg SC DAILY [facility]


   PRN Reason: Protocol


   Last Admin: 02/16/18 13:08 Dose:  40 mg


Hydromorphone HCl (Dilaudid)  1 mg IVP Q3 PRN


   PRN Reason: Pain, severe (8-10)


Hydromorphone HCl (Dilaudid)  0.5 mg IVP Q3 PRN


   PRN Reason: Pain, moderate (4-7)


Lactated Ringer's (Lactated Ringer's 500ml)  500 mls @ 250 mls/hr IV .Q2H [facility]


   Last Admin: 02/16/18 21:33 Dose:  Not Given


Metronidazole (Flagyl 500mg/100ml Ns)  100 mls @ 100 mls/hr IVPB Q8 [facility]


   PRN Reason: Protocol


   Last Admin: 02/17/18 01:05 Dose:  100 mls/hr


Lactated Ringer's (Lactated Ringer's)  1,000 mls @ 150 mls/hr IV .Q6H40M [facility]


   Last Admin: 02/16/18 13:08 Dose:  150 mls/hr


Piperacillin Sod/Tazobactam (Sod 3.375 gm/ Sodium Chloride)  100 mls @ 100 mls/

hr IVPB Q6 [facility]ETA


   PRN Reason: Protocol


   Last Admin: 02/17/18 04:17 Dose:  100 mls/hr


Lidocaine (Lidoderm)  2 ea TD DAILY [facility]


Tramadol HCl (Ultram)  50 mg PO BID [facility]


   Last Admin: 02/16/18 17:11 Dose:  50 mg











- Labs


Labs: 


 





 02/16/18 04:17 





 02/16/18 04:17 











- Constitutional


Appears: Non-toxic, No Acute Distress





- Head Exam


Head Exam: ATRAUMATIC





- Eye Exam


Eye Exam: EOMI.  absent: Scleral icterus





- Respiratory Exam


Respiratory Exam: NORMAL BREATHING PATTERN.  absent: Accessory Muscle Use, 

Respiratory Distress





- Cardiovascular Exam


Cardiovascular Exam: +S1, +S2.  absent: Bradycardia, Tachycardia





- GI/Abdominal Exam


GI & Abdominal Exam: Guarding, Soft, Tenderness.  absent: Distended, Firm, Rigid


Additional comments: 





tenderness to deep palpation in LLQ


voluntary guarding in LLQ


abd diffusely soft





- Extremities Exam


Extremities Exam: Normal Inspection.  absent: Calf Tenderness





- Neurological Exam


Neurological Exam: Alert, Awake, Oriented x3





- Skin


Skin Exam: Intact, Warm





Assessment and Plan





- Assessment and Plan (Free Text)


Assessment: 





37M w/ microperforated diverticulitis











Plan: 





- c/w abx


- f/u AM labs


- advance diet to fulls


- advance diet as tolerated 


- recommend GI f/u outpatient colonscopy in 6-8 weeks. 


- further recs per Dr. Robles surgical attending





Brendan Ambriz PGY1

## 2018-02-17 NOTE — CP.PCM.PN
Subjective





- Date & Time of Evaluation


Date of Evaluation: 02/17/18


Time of Evaluation: 08:15





- Subjective


Subjective: 





Patient seen and examined at bedside. He is laying comfortably in bed after 

tolerating clear liquid breakfast. He reports improved LLQ abdominal pain and 

denies fever, nausea or vomiting. Appetite good. Patient had loose, non-bloody 

BM yesterday. Blood culture no growth to date. 





Objective





- Vital Signs/Intake and Output


Vital Signs (last 24 hours): 


 











Temp Pulse Resp BP Pulse Ox


 


 98 F   82   16   111/75   98 


 


 02/17/18 08:22  02/17/18 08:22  02/17/18 08:22  02/17/18 08:22  02/17/18 08:22








Intake and Output: 


 











 02/17/18 02/17/18





 06:59 18:59


 


Intake Total 2200 


 


Output Total 1800 


 


Balance 400 














- Medications


Medications: 


 Current Medications





Acetaminophen (Tylenol 325mg Tab)  650 mg PO Q4 PRN


   PRN Reason: mild pain; fever >100.4


   Last Admin: 02/16/18 04:41 Dose:  650 mg


Enoxaparin Sodium (Lovenox)  40 mg SC DAILY [facility]ETA


   PRN Reason: Protocol


   Last Admin: 02/17/18 08:50 Dose:  40 mg


Hydromorphone HCl (Dilaudid)  1 mg IVP Q3 PRN


   PRN Reason: Pain, severe (8-10)


Hydromorphone HCl (Dilaudid)  0.5 mg IVP Q3 PRN


   PRN Reason: Pain, moderate (4-7)


Lactated Ringer's (Lactated Ringer's 500ml)  500 mls @ 250 mls/hr IV .Q2H [facility]


   Last Admin: 02/16/18 21:33 Dose:  Not Given


Metronidazole (Flagyl 500mg/100ml Ns)  100 mls @ 100 mls/hr IVPB Q8 [facility]A


   PRN Reason: Protocol


   Last Admin: 02/17/18 09:06 Dose:  100 mls/hr


Lactated Ringer's (Lactated Ringer's)  1,000 mls @ 150 mls/hr IV .Q6H40M [facility]


   Last Admin: 02/17/18 08:58 Dose:  150 mls/hr


Piperacillin Sod/Tazobactam (Sod 3.375 gm/ Sodium Chloride)  100 mls @ 100 mls/

hr IVPB Q6 SH[facility]ETA


   PRN Reason: Protocol


   Last Admin: 02/17/18 09:07 Dose:  100 mls/hr


Lidocaine (Lidoderm)  2 ea TD DAILY [facility]


   Last Admin: 02/17/18 08:52 Dose:  2 ea


Tramadol HCl (Ultram)  50 mg PO BID [facility]


   Last Admin: 02/17/18 08:56 Dose:  50 mg











- Labs


Labs: 


 





 02/16/18 04:17 





 02/16/18 04:17 











- Constitutional


Appears: Non-toxic, No Acute Distress





- Head Exam


Head Exam: ATRAUMATIC, NORMAL INSPECTION, NORMOCEPHALIC





- Eye Exam


Eye Exam: EOMI, PERRL.  absent: Scleral icterus





- ENT Exam


ENT Exam: Mucous Membranes Moist





- Respiratory Exam


Respiratory Exam: Clear to Ausculation Bilateral, NORMAL BREATHING PATTERN.  

absent: Rales, Rhonchi, Wheezes





- Cardiovascular Exam


Cardiovascular Exam: REGULAR RHYTHM, RRR, +S1, +S2





- GI/Abdominal Exam


GI & Abdominal Exam: Soft, Tenderness (LLQ tenderness to palpation), 

Hyperactive Bowel Sounds.  absent: Rebound





- Extremities Exam


Extremities Exam: Normal Capillary Refill.  absent: Calf Tenderness, Pedal Edema





- Neurological Exam


Neurological Exam: Alert, Awake, Oriented x3





- Psychiatric Exam


Psychiatric exam: Normal Affect, Normal Mood





- Skin


Skin Exam: Dry, Warm





Assessment and Plan





- Assessment and Plan (Free Text)


Assessment: 





36 y/o M admitted with acute diverticulitis with microperforation.


Plan: 





Acute Diverticulitis with microperforation


-Pain improving


-Afebrile this morning. Leukocytosis trending downward.


-General Surgery consult appreciated with no surgery indicated at this time. 


-GI consult appreciated. Pt will need GI f/u with outpatient colonoscopy in 6-8 

weeks


-Flagyl 500mg IV Q8h + Zosyn 3.375gm IV Q6h (Started 2/15, Current day 3)


-Blood Culture from 2/15 reveals no growth to date 


-Tolerating clear liquid diet. Will advance diet as tolerated.


-Consider Influenza test and repeat BCx if Fever 





DVT Prophylaxis


-Lovenox 40mg SC daily

## 2018-02-18 VITALS — SYSTOLIC BLOOD PRESSURE: 124 MMHG | HEART RATE: 87 BPM | TEMPERATURE: 98.5 F | DIASTOLIC BLOOD PRESSURE: 86 MMHG

## 2018-02-18 VITALS — RESPIRATION RATE: 16 BRPM

## 2018-02-18 VITALS — OXYGEN SATURATION: 97 %

## 2018-02-18 LAB
BASOPHILS # BLD AUTO: 0 K/UL (ref 0–0.2)
BASOPHILS NFR BLD: 0.7 % (ref 0–2)
EOSINOPHIL # BLD AUTO: 0.4 K/UL (ref 0–0.7)
EOSINOPHIL NFR BLD: 6.5 % (ref 0–4)
ERYTHROCYTE [DISTWIDTH] IN BLOOD BY AUTOMATED COUNT: 13.1 % (ref 11.5–14.5)
HGB BLD-MCNC: 12.5 G/DL (ref 12–18)
LYMPHOCYTES # BLD AUTO: 1.8 K/UL (ref 1–4.3)
LYMPHOCYTES NFR BLD AUTO: 26.7 % (ref 20–40)
MCH RBC QN AUTO: 27.9 PG (ref 27–31)
MCHC RBC AUTO-ENTMCNC: 32 G/DL (ref 33–37)
MCV RBC AUTO: 87.1 FL (ref 80–94)
MONOCYTES # BLD: 0.6 K/UL (ref 0–0.8)
MONOCYTES NFR BLD: 9.7 % (ref 0–10)
NEUTROPHILS # BLD: 3.7 K/UL (ref 1.8–7)
NEUTROPHILS NFR BLD AUTO: 56.4 % (ref 50–75)
NRBC BLD AUTO-RTO: 0 % (ref 0–0)
PLATELET # BLD: 239 K/UL (ref 130–400)
PMV BLD AUTO: 9 FL (ref 7.2–11.7)
RBC # BLD AUTO: 4.49 MIL/UL (ref 4.4–5.9)
WBC # BLD AUTO: 6.6 K/UL (ref 4.8–10.8)

## 2018-02-18 PROCEDURE — 3E0234Z INTRODUCTION OF SERUM, TOXOID AND VACCINE INTO MUSCLE, PERCUTANEOUS APPROACH: ICD-10-PCS | Performed by: FAMILY MEDICINE

## 2018-02-18 RX ADMIN — METRONIDAZOLE SCH MLS/HR: 500 INJECTION, SOLUTION INTRAVENOUS at 08:39

## 2018-02-18 RX ADMIN — METRONIDAZOLE SCH MLS/HR: 500 INJECTION, SOLUTION INTRAVENOUS at 00:09

## 2018-02-18 RX ADMIN — ENOXAPARIN SODIUM SCH MG: 40 INJECTION SUBCUTANEOUS at 08:42

## 2018-02-18 RX ADMIN — WATER SCH MLS/HR: 1 INJECTION INTRAMUSCULAR; INTRAVENOUS; SUBCUTANEOUS at 04:00

## 2018-02-18 RX ADMIN — WATER SCH MLS/HR: 1 INJECTION INTRAMUSCULAR; INTRAVENOUS; SUBCUTANEOUS at 08:58

## 2018-02-18 NOTE — CP.PCM.PN
Subjective





- Date & Time of Evaluation


Date of Evaluation: 02/18/18


Time of Evaluation: 09:35





- Subjective


Subjective: 





rfv diverticulitis


s: pain resolved. no new complaints. no diarrhea/rectal bleeding or fever


tolerating diet





Objective





- Vital Signs/Intake and Output


Vital Signs (last 24 hours): 


 











Temp Pulse Resp BP Pulse Ox


 


 98.5 F   87   16   124/86   97 


 


 02/18/18 12:14  02/18/18 12:14  02/18/18 12:14  02/18/18 12:14  02/18/18 12:14











- Labs


Labs: 


 





 02/18/18 06:23 





 02/16/18 04:17 











- Constitutional


Appears: No Acute Distress





- Eye Exam


Eye Exam: Normal appearance





- ENT Exam


ENT Exam: Mucous Membranes Moist





- Respiratory Exam


Respiratory Exam: NORMAL BREATHING PATTERN





- GI/Abdominal Exam


GI & Abdominal Exam: Soft.  absent: Guarding, Rigid, Tenderness





- Neurological Exam


Neurological Exam: Alert, Oriented x3





Assessment and Plan





- Assessment and Plan (Free Text)


Assessment: 





37 year old male with a h/o colitis admitted with left sided diverticulitis 

with microperforation.


1. Acute diverticulitis


Plan:


-improving


-recommend 2 week course of antibiotics


-outpatient follow up and colonoscopy in 6-8 weeks


-diet as tolerated

## 2018-02-18 NOTE — CP.PCM.DIS
Provider





- Provider


Date of Admission: 


02/15/18 18:51





Attending physician: 


Susy Bazan MD





Consults: 





General surgery, Dr Robles


Gastroenterology, Dr Willard/Dr Saldana


Time Spent in preparation of Discharge (in minutes): 30





Diagnosis





- Discharge Diagnosis


(1) Diverticulitis of colon with perforation


Status: Acute   





Hospital Course





- Lab Results


Lab Results: 


 Micro Results





02/15/18 19:30   Blood   Blood Culture - Preliminary


                            NO GROWTH AFTER 48 HOURS


02/15/18 19:05   Blood   Blood Culture - Preliminary


                            NO GROWTH AFTER 48 HOURS





 Most Recent Lab Values











WBC  6.6 K/uL (4.8-10.8)  D 02/18/18  06:23    


 


RBC  4.49 Mil/uL (4.40-5.90)   02/18/18  06:23    


 


Hgb  12.5 g/dL (12.0-18.0)   02/18/18  06:23    


 


Hct  39.1 % (35.0-51.0)   02/18/18  06:23    


 


MCV  87.1 fl (80.0-94.0)   02/18/18  06:23    


 


MCH  27.9 pg (27.0-31.0)   02/18/18  06:23    


 


MCHC  32.0 g/dL (33.0-37.0)  L  02/18/18  06:23    


 


RDW  13.1 % (11.5-14.5)   02/18/18  06:23    


 


Plt Count  239 K/uL (130-400)   02/18/18  06:23    


 


MPV  9.0 fl (7.2-11.7)   02/18/18  06:23    


 


Neut % (Auto)  56.4 % (50.0-75.0)   02/18/18  06:23    


 


Lymph % (Auto)  26.7 % (20.0-40.0)   02/18/18  06:23    


 


Mono % (Auto)  9.7 % (0.0-10.0)   02/18/18  06:23    


 


Eos % (Auto)  6.5 % (0.0-4.0)  H  02/18/18  06:23    


 


Baso % (Auto)  0.7 % (0.0-2.0)   02/18/18  06:23    


 


Neut # (Auto)  3.7 K/uL (1.8-7.0)   02/18/18  06:23    


 


Lymph # (Auto)  1.8 K/uL (1.0-4.3)   02/18/18  06:23    


 


Mono # (Auto)  0.6 K/uL (0.0-0.8)   02/18/18  06:23    


 


Eos # (Auto)  0.4 K/uL (0.0-0.7)   02/18/18  06:23    


 


Baso # (Auto)  0.0 K/uL (0.0-0.2)   02/18/18  06:23    


 


Neutrophils % (Manual)  78 % (42-75)  H  02/15/18  15:05    


 


Band Neutrophils %  1 % (0-2)   02/15/18  15:05    


 


Lymphocytes % (Manual)  12 % (20-50)  L  02/15/18  15:05    


 


Monocytes % (Manual)  9 % (0-10)   02/15/18  15:05    


 


Platelet Estimate  Normal  (NORMAL)   02/15/18  15:05    


 


RBC Morphology  Normal  (NORMAL)   02/15/18  15:05    


 


pO2  35 mm/Hg (30-55)   02/15/18  19:40    


 


VBG pH  7.39  (7.32-7.43)   02/15/18  19:40    


 


VBG pCO2  45 mmHg (40-60)   02/15/18  19:40    


 


VBG HCO3  25.4 mmol/L  02/15/18  19:40    


 


VBG Total CO2  28.6 mmol/L (22-28)  H  02/15/18  19:40    


 


VBG O2 Sat (Calc)  74.7 % (40-65)  H  02/15/18  19:40    


 


VBG Base Excess  1.7 mmol/L (0.0-2.0)   02/15/18  19:40    


 


VBG Potassium  3.5 mmol/L (3.6-5.2)  L  02/15/18  19:40    


 


Sodium  138.0 mmol/L (132-148)   02/15/18  19:40    


 


Chloride  104.0 mmol/L ()   02/15/18  19:40    


 


Glucose  101 mg/dL ()   02/15/18  19:40    


 


Lactate  1.0 mmol/L (0.7-2.1)   02/15/18  19:40    


 


FiO2  21.0 %  02/15/18  19:40    


 


Sodium  142 mmol/l (132-148)   02/16/18  04:17    


 


Potassium  3.7 MMOL/L (3.6-5.0)   02/16/18  04:17    


 


Chloride  106 mmol/L ()   02/16/18  04:17    


 


Carbon Dioxide  24 mmol/L (22-30)   02/16/18  04:17    


 


Anion Gap  16  (10-20)   02/16/18  04:17    


 


BUN  11 mg/dl (9-20)   02/16/18  04:17    


 


Creatinine  0.8 mg/dl (0.8-1.5)   02/16/18  04:17    


 


Est GFR ( Amer)  > 60   02/16/18  04:17    


 


Est GFR (Non-Af Amer)  > 60   02/16/18  04:17    


 


Random Glucose  84 mg/dL ()   02/16/18  04:17    


 


Calcium  8.5 mg/dL (8.4-10.2)   02/16/18  04:17    


 


Total Bilirubin  0.8 mg/dl (0.2-1.3)   02/16/18  04:17    


 


AST  15 U/L (17-59)  L  02/16/18  04:17    


 


ALT  28 U/L (21-72)   02/16/18  04:17    


 


Alkaline Phosphatase  68 U/L ()   02/16/18  04:17    


 


Total Protein  6.5 G/DL (6.3-8.2)   02/16/18  04:17    


 


Albumin  3.3 g/dL (3.5-5.0)  L  02/16/18  04:17    


 


Globulin  3.2 gm/dL (2.2-3.9)   02/16/18  04:17    


 


Albumin/Globulin Ratio  1.1  (1.0-2.1)   02/16/18  04:17    


 


Lipase  11 U/L ()  L  02/15/18  15:05    


 


Venous Blood Potassium  3.5 mmol/L (3.6-5.2)  L  02/15/18  19:40    














- Hospital Course


Hospital Course: 





38 y/o M with unremarkable PMH presented to ED with 2 days of LLQ abdominal 

pain. Patient was subsequently admitted for acute diverticulitis. He had 

associated microperforation so general surgery was consulted who stated no 

surgery was indicated. GI was consulted and patient received IV antibiotics. 

His diet was gradually resumed and symptoms improved significantly. Plan was 

made to follow up at [facility] and with GI for planned colonoscopy in 6-8 weeks. 





Discharge Exam





- Head Exam


Head Exam: NORMAL INSPECTION





- Eye Exam


Eye Exam: EOMI, Normal appearance.  absent: Scleral icterus





- ENT Exam


ENT Exam: Mucous Membranes Moist





- Respiratory Exam


Respiratory Exam: Clear to PA & Lateral, NORMAL BREATHING PATTERN.  absent: 

Rales, Rhonchi, Wheezes





- Cardiovascular Exam


Cardiovascular Exam: REGULAR RHYTHM, RRR, +S1, +S2





- GI/Abdominal Exam


GI & Abdominal Exam: Normal Bowel Sounds, Soft.  absent: Distended, Tenderness





- Neurological Exam


Neurological exam: Alert, Oriented x3





- Psychiatric Exam


Psychiatric exam: Normal Affect, Normal Mood





- Skin


Skin Exam: Dry, Normal Color





Discharge Plan





- Discharge Medications


Prescriptions: 


Ciprofloxacin [Cipro] 500 mg PO Q12 #14 tab


Metronidazole [Flagyl] 500 mg PO Q8H #21 tablet





- Follow Up Plan


Condition: FAIR


Disposition: HOME/ ROUTINE


Instructions:  Diverticulitis (DC), Sepsis, Adult (DC)


Additional Instructions: 


Follow up at [facility]. Appt with Dr Schneider on 2/23/18 @3:20PM


To follow with GI clinic to schedule colonoscopy for 6-8weeks


Complete 7 day course of cipro/flagyl:


   ciprofloxacin 500mg PO q12h


   metronidazole 500mg PO Q8h


ED precautions reviewed


Referrals: 


[facility] [Outside]


Briana Saldana MD [Medical Doctor] -

## 2018-02-18 NOTE — CP.PCM.PN
Subjective





- Date & Time of Evaluation


Date of Evaluation: 02/18/18


Time of Evaluation: 09:00





- Subjective


Subjective: 


General Surgery


Dr. Robles





Pt S&E @bedside. NAEO. pt has no complaints. denies F/C, abd pain N/V. 

tolerating regular diet. (+)BM/Flatus





Objective





- Vital Signs/Intake and Output


Vital Signs (last 24 hours): 


 











Temp Pulse Resp BP Pulse Ox


 


 98.8 F   85   16   126/84   97 


 


 02/18/18 08:07  02/18/18 08:07  02/18/18 08:07  02/18/18 08:07  02/18/18 08:07











- Medications


Medications: 


 Current Medications





Acetaminophen (Tylenol 325mg Tab)  650 mg PO Q4 PRN


   PRN Reason: mild pain; fever >100.4


   Last Admin: 02/16/18 04:41 Dose:  650 mg


Enoxaparin Sodium (Lovenox)  40 mg SC DAILY SHWETA


   PRN Reason: Protocol


   Last Admin: 02/18/18 08:42 Dose:  40 mg


Hydromorphone HCl (Dilaudid)  0.5 mg IVP Q3 PRN


   PRN Reason: Pain, severe (8-10)


Lactated Ringer's (Lactated Ringer's 500ml)  500 mls @ 250 mls/hr IV .Q2H [facility]


   Last Admin: 02/16/18 21:33 Dose:  Not Given


Metronidazole (Flagyl 500mg/100ml Ns)  100 mls @ 100 mls/hr IVPB Q8 SHWETA


   PRN Reason: Protocol


   Last Admin: 02/18/18 08:39 Dose:  100 mls/hr


Lactated Ringer's (Lactated Ringer's)  1,000 mls @ 150 mls/hr IV .Q6H40M [facility]


   Last Admin: 02/18/18 08:46 Dose:  150 mls/hr


Piperacillin Sod/Tazobactam (Sod 3.375 gm/ Sodium Chloride)  100 mls @ 100 mls/

hr IVPB Q6 SHWETA


   PRN Reason: Protocol


   Last Admin: 02/18/18 08:58 Dose:  100 mls/hr


Lidocaine (Lidoderm)  2 ea TD DAILY [facility]


   Last Admin: 02/18/18 08:41 Dose:  2 ea


Oxycodone/Acetaminophen (Percocet 5/325 Mg Tab)  1 tab PO Q4 PRN


   PRN Reason: Pain, moderate (4-7)


   Stop: 02/21/18 07:53


Tramadol HCl (Ultram)  50 mg PO BID [facility]


   Last Admin: 02/18/18 08:41 Dose:  50 mg











- Labs


Labs: 


 





 02/18/18 06:23 





 02/16/18 04:17 











- Constitutional


Appears: Non-toxic, No Acute Distress





- Head Exam


Head Exam: NORMAL INSPECTION





- Eye Exam


Eye Exam: Normal appearance





- ENT Exam


ENT Exam: Mucous Membranes Moist





- Respiratory Exam


Respiratory Exam: NORMAL BREATHING PATTERN.  absent: Accessory Muscle Use, 

Respiratory Distress





- GI/Abdominal Exam


GI & Abdominal Exam: Soft.  absent: Distended, Guarding, Tenderness, Rebound





- Extremities Exam


Extremities Exam: Normal Inspection





- Neurological Exam


Neurological Exam: Alert, Awake, Oriented x3





- Psychiatric Exam


Psychiatric exam: Normal Affect, Normal Mood





- Skin


Skin Exam: Dry, Intact, Normal Color, Warm





Assessment and Plan





- Assessment and Plan (Free Text)


Assessment: 





36 y/o M w/ microperforated diverticulitis





- resolved leukocytosis --> cont Abx


- ADAT


- cont pain management


- cont medical management


- f/u GI in 6-8weeks for outpatient colonoscopy





Pt discussed w/ Dr. Travis Orantes DO PGY2

## 2018-02-24 ENCOUNTER — HOSPITAL ENCOUNTER (EMERGENCY)
Dept: HOSPITAL 14 - H.ER | Age: 38
Discharge: HOME | End: 2018-02-24
Payer: COMMERCIAL

## 2018-02-24 VITALS
SYSTOLIC BLOOD PRESSURE: 138 MMHG | HEART RATE: 100 BPM | OXYGEN SATURATION: 100 % | TEMPERATURE: 98 F | RESPIRATION RATE: 18 BRPM | DIASTOLIC BLOOD PRESSURE: 88 MMHG

## 2018-02-24 DIAGNOSIS — R79.9: Primary | ICD-10-CM

## 2018-02-24 LAB
ALBUMIN SERPL-MCNC: 4.1 G/DL (ref 3.5–5)
ALBUMIN/GLOB SERPL: 1.1 {RATIO} (ref 1–2.1)
ALT SERPL-CCNC: 76 U/L (ref 21–72)
AST SERPL-CCNC: 29 U/L (ref 17–59)
BASOPHILS # BLD AUTO: 0 K/UL (ref 0–0.2)
BASOPHILS NFR BLD: 0.6 % (ref 0–2)
BUN SERPL-MCNC: 14 MG/DL (ref 9–20)
CALCIUM SERPL-MCNC: 9.4 MG/DL (ref 8.4–10.2)
EOSINOPHIL # BLD AUTO: 0.2 K/UL (ref 0–0.7)
EOSINOPHIL NFR BLD: 3.5 % (ref 0–4)
ERYTHROCYTE [DISTWIDTH] IN BLOOD BY AUTOMATED COUNT: 13.5 % (ref 11.5–14.5)
GFR NON-AFRICAN AMERICAN: > 60
HGB BLD-MCNC: 15 G/DL (ref 12–18)
LYMPHOCYTES # BLD AUTO: 2.5 K/UL (ref 1–4.3)
LYMPHOCYTES NFR BLD AUTO: 38.4 % (ref 20–40)
MCH RBC QN AUTO: 28.5 PG (ref 27–31)
MCHC RBC AUTO-ENTMCNC: 32.8 G/DL (ref 33–37)
MCV RBC AUTO: 87.1 FL (ref 80–94)
MONOCYTES # BLD: 0.6 K/UL (ref 0–0.8)
MONOCYTES NFR BLD: 8.7 % (ref 0–10)
NEUTROPHILS # BLD: 3.2 K/UL (ref 1.8–7)
NEUTROPHILS NFR BLD AUTO: 48.8 % (ref 50–75)
NRBC BLD AUTO-RTO: 0 % (ref 0–0)
PLATELET # BLD: 398 K/UL (ref 130–400)
PMV BLD AUTO: 8.4 FL (ref 7.2–11.7)
RBC # BLD AUTO: 5.25 MIL/UL (ref 4.4–5.9)
WBC # BLD AUTO: 6.6 K/UL (ref 4.8–10.8)

## 2018-02-24 NOTE — ED PDOC
HPI: General Adult


Time Seen by Provider: 18 08:14


Chief Complaint (Nursing): Abnormal Labs


Chief Complaint (Provider): Abnormal lab


History Per: Patient


History/Exam Limitations: no limitations


Additional Complaint(s): 





Pt received call yesterday to return to ED for "infection".  Pt discharged from 

hospital on 18 after being admitted for diverticulitis, currently on Cipro 

and Flagyl.  Pt without complaints. 





Past Medical History


Reviewed: Nursing Documentation, Vital Signs


Vital Signs: 


 Last Vital Signs











Temp  98 F   18 08:22


 


Pulse  100 H  18 08:22


 


Resp  18   18 08:22


 


BP  138/88   18 08:22


 


Pulse Ox  100   18 12:05














- Medical History


PMH: No Chronic Diseases


   Denies: Chronic Kidney Disease





- Surgical History


Surgical History: No Surg Hx





- Family History


Family History: States: Unknown Family Hx





- Social History


Current smoker - smoking cessation education provided: No


Alcohol: None





- Home Medications


Home Medications: 


 Ambulatory Orders











 Medication  Instructions  Recorded


 


Ciprofloxacin [Cipro] 500 mg PO Q12 #14 tab 18


 


Metronidazole [Flagyl] 500 mg PO Q8H #21 tablet 18














- Allergies


Allergies/Adverse Reactions: 


 Allergies











Allergy/AdvReac Type Severity Reaction Status Date / Time


 


No Known Allergies Allergy   Verified 02/15/18 14:16














Review of Systems


Constitutional: Negative for: Fever, Chills


Cardiovascular: Negative for: Chest Pain


Respiratory: Negative for: Cough, Shortness of Breath


Gastrointestinal: Negative for: Nausea, Vomiting, Abdominal Pain, Diarrhea, 

Constipation, Hematochezia, Hematemesis


Genitourinary Male: Negative for: Dysuria, Hematuria


Musculoskeletal: Negative for: Neck Pain, Back Pain


Skin: Negative for: Rash, Lesions


Neurological: Negative for: Headache





Physical Exam





- Reviewed


Nursing Documentation Reviewed: Yes


Vital Signs Reviewed: Yes





- Physical Exam


Appears: Positive for: Well, No Acute Distress


Skin: Positive for: Normal Color, Warm, Dry


Eye Exam: Positive for: Normal appearance, EOMI, PERRL


Cardiovascular/Chest: Positive for: Regular Rate, Rhythm


Respiratory: Positive for: Normal Breath Sounds.  Negative for: Rales, Rhonchi, 

Wheezing


Gastrointestinal/Abdominal: Positive for: Normal Exam, Bowel Sounds, Soft.  

Negative for: Tenderness, Guarding, Rebound


Back: Positive for: Normal Inspection


Extremity: Positive for: Normal ROM


Neurologic/Psych: Positive for: Alert, Oriented





- Laboratory Results


Result Diagrams: 


 18 08:51





 18 08:51





- ECG


O2 Sat by Pulse Oximetry: 100





Medical Decision Making


Medical Decision Makin yo male presents for repeat blood work.


- labs





Case discussed with FP resident, states pt was told to return for abnormal 

blood culture results.





12:00


Pt evaluated by Dr. Jose Matt and resident in ED, pt to be discharged to 

follow-up in Clinic on 3/3/18.





Disposition





- Clinical Impression


Clinical Impression: 


 Abnormal blood finding








- Disposition


Referrals: 


[facility] [Outside]


Disposition: Routine/Home


Disposition Time: 12:04


Condition: STABLE


Additional Instructions: 


CONTINUE MEDICATIONS AS PRESCRIBED.  FOLLOW-UP WITH CLINIC ON 3/3/18.


Instructions:  Blood Culture


Forms:  [employer] (Lao)


Print Language: Tamazight